# Patient Record
Sex: FEMALE | Race: WHITE | NOT HISPANIC OR LATINO | Employment: UNEMPLOYED | URBAN - METROPOLITAN AREA
[De-identification: names, ages, dates, MRNs, and addresses within clinical notes are randomized per-mention and may not be internally consistent; named-entity substitution may affect disease eponyms.]

---

## 2023-10-04 NOTE — PROGRESS NOTES
[facility]   Pelvic Health Physical Therapy Initial Evaluation     Date: 10/6/2023   Name: Enrique Kilgore  Clinic Number: [id number]    Therapy Diagnosis:   Encounter Diagnoses   Name Primary?    Pelvic pain     Pelvic floor dysfunction      Physician: Sahra Bourgeois MD    Physician Orders: PT Eval and Treat   Medical Diagnosis from Referral: Pelvic pain [R10.2]  Evaluation Date: 10/6/2023  Authorization Period Expiration: 10/02/2024  Plan of Care Expiration: 12/15/2023  Progress Note Due: 2023  Visit # / Visits authorized:    FOTO: Issued Visit #: 1 /3    Precautions: Standard    Time In: 9:15  Time Out: 10:20  Total Appointment Time (timed & untimed codes): 65 minutes    SUBJECTIVE     Date of onset: 10 years ago    History of current condition - Enrique reports: stents placed in fallopian tubes in  to create scar tissue and decrease risk of pregnancy. Over the past 10 years, she has noticed an increase in fatigue, back pain, pelvic pain, and hair loss. She reports pain at times with intercourse and occasional bleeding afterwards. Hormone levels were checked and cleared (Prolactin and FSH). Patient is scheduled for an ultrasound today to view stents.  Patient reporting vaginal delivery in 2013 with 1-2 stitches from tear.     ASIS to PSIS and lower abdominal suprapubic area- hurts everyday. 3/10 . Reports pain with deep pressure. When standing from sitting, she gets a pulling sensation on the front left side of her lower extremity.    [place] pain- she reports that it feels like hitting a wall that that is about to burst. Astroglide brnad lubrication used. 4/10.     OB/GYN History: , vaginal delivery, pushing phase under 30 minutes, painful vaginal penetration, and pelvic pain, 3 UTIs this year  Sexually active? Yes  Pain with vaginal exams, intercourse or tampon use? Yes    Bladder/Bowel History:   Frequency of urination:   Daytime: 5           Nighttime:  0-1  Difficulty initiating urine stream: No  Urine stream: strong  Complete emptying: Yes  Bladder leakage: No  Frequency of incidents: none  Amount leaked (urine):  none  Urinary Urgency: Yes, Able to delay the urge for at least 30 minute(s).  Frequency of bowel movements: once a day  Difficulty initiating BM: No  Quality/Shape of BM: [place] Stool Chart Type 3-4  Does Patient Feel Empty after BM? Yes  Fiber Supplements or Laxative Use? No  Colon leakage: No  Frequency of incidents: none   Amount leaked (bowels):  none  Form of protection: none  Number of pads required in 24 hours: 0      Imaging: ultrasound today     Prior Therapy: no   Social History:  lives with their family  Current exercise: not as much  Occupation:   Prior Level of Function: WFL  Current Level of Function: WFL    Types of fluid intake: water and coffee  Diet: regular    Abuse/Neglect: No     Patients goals: decrease pelvic pain     Medical History: Enrique  has a past medical history of Mitral valve disorder (5/23/2012 10:42:22 AM) and Myalgia and myositis (5/23/2012 10:43:07 AM).     Surgical History: Enrique Kilgore  has a past surgical history that includes Endometrial ablation.    Medications: Enrique has a current medication list which includes the following prescription(s): buspirone, dextroamphetamine-amphetamine, escitalopram oxalate, and trazodone.    Allergies:   Review of patient's allergies indicates:  No Known Allergies     OBJECTIVE     See EMR under MEDIA for written consent provided 10/6/2023    ORTHO SCREEN  Posture in sitting: WNL  Posture in standing: WNL  Pelvic alignment: no sign of deviations noted in supine   SI Joint Palpation: Denies tenderness to SI joint palpation bilaterally. Pain above reported with deep pressure.     AROM:    Thoracolumbar  (single inclinometer at L4/5) AROM  (degrees) Pain/Dysfunction with Movement   Flexion WNL    Extension WNL    Right side bending WNL    Left side bending WNL     Right rotation WNL    Left rotation WNL        Strength:     L/E MMT Right Left Pain/Dysfunction with Movement   Hip Flexion 4/5 4-/5    Hip Extension 4/5 4-/5    Hip Abduction 4+/5 4+/5    Knee Flexion 4+/5 4/5    Knee Extension 4+/5 4/5      Mario test: negative R, positive L     ABDOMINAL WALL ASSESSMENT  Palpation:  WNL  Abdominal strength: Transverse abdominus: good   Scarring: lower abdominal scar from tummy tuck surgery, 3 gallbladder scars upper and right abdominal area  Pelvic Floor Muscle and Transverse Abdominus Synergy: present  Diastasis: absent      BREATHING MECHANICS ASSESSMENT   Thorax Assessment During Quiet Respiration: Decreased excursion of abdominal wall  and Decreased excursion bilaterally of lateral ribs   Thorax Assessment During Deep Respiration: Decreased excursion of abdominal wall     VAGINAL PELVIC FLOOR EXAM    EXTERNAL ASSESSMENT  Introitus: gaping  Skin condition: WNL  Scarring: none   Sensation: WNL   Pain: none  Voluntary contraction: visible lift  Voluntary relaxation: visible drop  Involuntary contraction: reflex tightening  Bearing down: bulge      INTERNAL ASSESSMENT  Pain: tender areas noted as follows: left ischiocavernosus and superficial transverse perineal   Sensation: able to localized pressure appropriately   Vaginal vault: WNL   Muscle Bulk: WFL; increased bulk on the left   Muscle Power: 3-/5  Muscle Endurance: 4 sec   Prolapse check: anterior vaginal wall weakness    Limitation/Restriction for FOTO Pelvic Floor Survey    Therapist reviewed FOTO scores for Enrique Kilgore on 10/6/2023.   FOTO documents entered into [employer] - see Media section.    Limitation Score:   PFDI Pain: 4       TREATMENT     Total Treatment time (time-based codes) separate from Evaluation: 28 minutes     Neuromuscular Re-education to improve Coordination, Control, Down training, Proprioception, and Sense for 20 minutes including:     Diaphragmatic breathing  TA contraction  Happy baby stretch  with deep breathing - reports feeling good to back   Cat cow with deep breathing  Posterior pelvic tilts  Hip flexor stretch     Self-Care/Home Management to improve behavioral/activity modifications related to ADLs, compensatory training, safety procedures, and adaptive equipment for 8 minutes including:    Education on relationship between transverse abdominus and pelvic floor muscles  Education on different types of lubrication  Education on pelvic floor muscle down training     PATIENT EDUCATION AND HOME EXERCISES     Education provided:   general anatomy/physiology of urinary/ bowel  system, benefits of treatment, risks of treatment, and alternative methods of treatment were discussed with the patient. Additionally, anatomy/physiology of pelvic floor, posture/body mechanices, intercourse positions, diaphragmatic breathing, isometric abdominal exercises, kegels, and fluid intake/dietary modifications were reviewed.     Written Home Exercises provided: yes.  Exercises were reviewed and Enrique was able to demonstrate them prior to the end of the session. Enrique demonstrated good  understanding of the education provided. See EMR under Patient Instructions for exercises provided during therapy sessions.    ASSESSMENT     Enrique is a 38 y.o. female referred to outpatient Physical Therapy with a medical diagnosis of pelvic pain. Pt presents with poor trunk stability, pelvic floor tenderness, decreased pelvic muscle strength, decreased endurance of the pelvic muscles, decreased phasic ability of the pelvic muscles, increased tension of the pelvic muscles, poor quality of pelvic muscle contraction, increased nocturia, and poor fluid intake.      Patient prognosis is Fair.   Patient will benefit from skilled outpatient Physical Therapy to address the deficits stated above and in the chart below, provide patient/family education, and to maximize patient's level of independence.     Plan of care discussed with patient:  Yes  Patient's spiritual, cultural and educational needs considered and patient is agreeable to the plan of care and goals as stated below:     Anticipated Barriers for therapy: none    Medical Necessity is demonstrated by the following:    History  Co-morbidities and personal factors that may impact the plan of care [x] LOW: no personal factors / co-morbidities  [] MODERATE: 1-2 personal factors / co-morbidities  [] HIGH: 3+ personal factors / co-morbidities    Personal Factors:   no deficits     Examination  Body Structures and Functions, activity limitations and participation restrictions that may impact the plan of care [x] LOW: addressing 1-2 elements  [] MODERATE: 3+ elements  [] HIGH: 4+ elements (please support below)   Clinical Presentation [x] LOW: stable  [] MODERATE: Evolving  [] HIGH: Unstable     Decision Making/ Complexity Score: low        Goals:  Short Term Goals: 5 weeks   - Pt will demonstrate excellent knowledge and adherence to HEP to facilitate optimal recovery.  - Pt will demonstrate proper PFM contraction, relaxation, and lengthening coordinated with TA and breath for improved muscle coordination needed for functional activity.    Long Term Goals: 10 weeks   - Pt will demonstrate excellent knowledge and adherence to HEP for continued self-maintenance of symptoms.  - Pt will report FOTO score of 0 indicating clinically relevant increase in function.  - Pt will report voiding interval of 2-3 hours for improved ADL tolerance.  - Pt will demonstrate PFM strength of at least 3/5 MMT for improved strength needed to maintain continence.   - Pt will report bearing down appropriately 100% of the time for improved bowel function and decreased stress on adjacent pelvic structures.   - Pt will be able to successfully complete sexual intercourse without pain for improved ADL tolerance.   - Pt will report ability to tolerate speculum exam without pain for improved access to healthcare.  - Pt to increase  strength of B LE by 1 grade to improve stability with functional mobility and improve gait quality for decreased risk for falls with ADLs.  - Patient will report 1/10 pain at worst with sexual intercourse to show improvement in pelvic floor muscle tension and overall mobility.     PLAN     Plan of care Certification: 10/6/2023 to 12/15/2023.    Outpatient Physical Therapy 1 time(s) every 1-2 week(s) for 10 weeks to include the following interventions: Cervical/Lumbar Traction, Electrical Stimulation TENS/IFC, Manual Therapy, Moist Heat/ Ice, Neuromuscular Re-ed, Patient Education, Self Care, Therapeutic Activities, and Therapeutic Exercise.     Nabil Perrin, PT      I CERTIFY THE NEED FOR THESE SERVICES FURNISHED UNDER THIS PLAN OF TREATMENT AND WHILE UNDER MY CARE   Physician's comments:     Physician's Signature: ___________________________________________________

## 2023-10-06 ENCOUNTER — CLINICAL SUPPORT (OUTPATIENT)
Dept: REHABILITATION | Facility: HOSPITAL | Age: 38
End: 2023-10-06
Attending: OBSTETRICS & GYNECOLOGY

## 2023-10-06 DIAGNOSIS — R10.2 PELVIC PAIN: ICD-10-CM

## 2023-10-06 DIAGNOSIS — M62.89 PELVIC FLOOR DYSFUNCTION: ICD-10-CM

## 2023-10-06 PROCEDURE — 97535 SELF CARE MNGMENT TRAINING: CPT | Mod: PN,97

## 2023-10-06 PROCEDURE — 97161 PT EVAL LOW COMPLEX 20 MIN: CPT | Mod: PN,97

## 2023-10-06 PROCEDURE — 97112 NEUROMUSCULAR REEDUCATION: CPT | Mod: PN,97

## 2023-10-06 NOTE — PLAN OF CARE
[facility]   Pelvic Health Physical Therapy Initial Evaluation     Date: 10/6/2023   Name: Enrique Kilgore  Clinic Number: [id number]    Therapy Diagnosis:   Encounter Diagnoses   Name Primary?    Pelvic pain     Pelvic floor dysfunction      Physician: Sahra Bourgeois MD    Physician Orders: PT Eval and Treat   Medical Diagnosis from Referral: Pelvic pain [R10.2]  Evaluation Date: 10/6/2023  Authorization Period Expiration: 10/02/2024  Plan of Care Expiration: 12/15/2023  Progress Note Due: 2023  Visit # / Visits authorized:    FOTO: Issued Visit #: 1 /3    Precautions: Standard    Time In: 9:15  Time Out: 10:20  Total Appointment Time (timed & untimed codes): 65 minutes    SUBJECTIVE     Date of onset: 10 years ago    History of current condition - Enrique reports: stents placed in fallopian tubes in  to create scar tissue and decrease risk of pregnancy. Over the past 10 years, she has noticed an increase in fatigue, back pain, pelvic pain, and hair loss. She reports pain at times with intercourse and occasional bleeding afterwards. Hormone levels were checked and cleared (Prolactin and FSH). Patient is scheduled for an ultrasound today to view stents.  Patient reporting vaginal delivery in 2013 with 1-2 stitches from tear.     ASIS to PSIS and lower abdominal suprapubic area- hurts everyday. 3/10 . Reports pain with deep pressure. When standing from sitting, she gets a pulling sensation on the front left side of her lower extremity.    [place] pain- she reports that it feels like hitting a wall that that is about to burst. Astroglide brnad lubrication used. 4/10.     OB/GYN History: , vaginal delivery, pushing phase under 30 minutes, painful vaginal penetration, and pelvic pain, 3 UTIs this year  Sexually active? Yes  Pain with vaginal exams, intercourse or tampon use? Yes    Bladder/Bowel History:   Frequency of urination:   Daytime: 5           Nighttime:  0-1  Difficulty initiating urine stream: No  Urine stream: strong  Complete emptying: Yes  Bladder leakage: No  Frequency of incidents: none  Amount leaked (urine): none  Urinary Urgency: Yes, Able to delay the urge for at least 30 minute(s).  Frequency of bowel movements: once a day  Difficulty initiating BM: No  Quality/Shape of BM: [place] Stool Chart Type 3-4  Does Patient Feel Empty after BM? Yes  Fiber Supplements or Laxative Use? No  Colon leakage: No  Frequency of incidents: none   Amount leaked (bowels): none  Form of protection: none  Number of pads required in 24 hours: 0      Imaging: ultrasound today     Prior Therapy: no   Social History:  lives with their family  Current exercise: not as much  Occupation:   Prior Level of Function: WFL  Current Level of Function: WFL    Types of fluid intake: water and coffee  Diet: regular    Abuse/Neglect: No     Patients goals: decrease pelvic pain     Medical History: Enrique  has a past medical history of Mitral valve disorder (5/23/2012 10:42:22 AM) and Myalgia and myositis (5/23/2012 10:43:07 AM).     Surgical History: Enrique Kilgore  has a past surgical history that includes Endometrial ablation.    Medications: Enrique has a current medication list which includes the following prescription(s): buspirone, dextroamphetamine-amphetamine, escitalopram oxalate, and trazodone.    Allergies:   Review of patient's allergies indicates:  No Known Allergies     OBJECTIVE     See EMR under MEDIA for written consent provided 10/6/2023    ORTHO SCREEN  Posture in sitting: WNL  Posture in standing: WNL  Pelvic alignment: no sign of deviations noted in supine   SI Joint Palpation: Denies tenderness to SI joint palpation bilaterally. Pain above reported with deep pressure.     AROM:    Thoracolumbar  (single inclinometer at L4/5) AROM  (degrees) Pain/Dysfunction with Movement   Flexion WNL    Extension WNL    Right side bending WNL    Left side bending WNL     Right rotation WNL    Left rotation WNL        Strength:     L/E MMT Right Left Pain/Dysfunction with Movement   Hip Flexion 4/5 4-/5    Hip Extension 4/5 4-/5    Hip Abduction 4+/5 4+/5    Knee Flexion 4+/5 4/5    Knee Extension 4+/5 4/5      Mario test: negative R, positive L     ABDOMINAL WALL ASSESSMENT  Palpation: WNL  Abdominal strength: Transverse abdominus: good   Scarring: lower abdominal scar from tummy tuck surgery, 3 gallbladder scars upper and right abdominal area  Pelvic Floor Muscle and Transverse Abdominus Synergy: present  Diastasis: absent      BREATHING MECHANICS ASSESSMENT   Thorax Assessment During Quiet Respiration: Decreased excursion of abdominal wall  and Decreased excursion bilaterally of lateral ribs   Thorax Assessment During Deep Respiration: Decreased excursion of abdominal wall     VAGINAL PELVIC FLOOR EXAM    EXTERNAL ASSESSMENT  Introitus: gaping  Skin condition: WNL  Scarring: none   Sensation: WNL   Pain: none  Voluntary contraction: visible lift  Voluntary relaxation: visible drop  Involuntary contraction: reflex tightening  Bearing down: bulge      INTERNAL ASSESSMENT  Pain: tender areas noted as follows: left ischiocavernosus and superficial transverse perineal   Sensation: able to localized pressure appropriately   Vaginal vault: WNL   Muscle Bulk: WFL; increased bulk on the left   Muscle Power: 3-/5  Muscle Endurance: 4 sec   Prolapse check: anterior vaginal wall weakness    Limitation/Restriction for FOTO Pelvic Floor Survey    Therapist reviewed FOTO scores for Enrique Kilgore on 10/6/2023.   FOTO documents entered into [employer] - see Media section.    Limitation Score:   PFDI Pain: 4       TREATMENT     Total Treatment time (time-based codes) separate from Evaluation: 28 minutes     Neuromuscular Re-education to improve Coordination, Control, Down training, Proprioception, and Sense for 20 minutes including:     Diaphragmatic breathing  TA contraction  Happy baby stretch with  deep breathing - reports feeling good to back   Cat cow with deep breathing  Posterior pelvic tilts  Hip flexor stretch     Self-Care/Home Management to improve behavioral/activity modifications related to ADLs, compensatory training, safety procedures, and adaptive equipment for 8 minutes including:    Education on relationship between transverse abdominus and pelvic floor muscles  Education on different types of lubrication  Education on pelvic floor muscle down training     PATIENT EDUCATION AND HOME EXERCISES     Education provided:   general anatomy/physiology of urinary/ bowel  system, benefits of treatment, risks of treatment, and alternative methods of treatment were discussed with the patient. Additionally, anatomy/physiology of pelvic floor, posture/body mechanices, intercourse positions, diaphragmatic breathing, isometric abdominal exercises, kegels, and fluid intake/dietary modifications were reviewed.     Written Home Exercises provided: yes.  Exercises were reviewed and Enrique was able to demonstrate them prior to the end of the session. Enrique demonstrated good  understanding of the education provided. See EMR under Patient Instructions for exercises provided during therapy sessions.    ASSESSMENT     Enrique is a 38 y.o. female referred to outpatient Physical Therapy with a medical diagnosis of pelvic pain. Pt presents with poor trunk stability, pelvic floor tenderness, decreased pelvic muscle strength, decreased endurance of the pelvic muscles, decreased phasic ability of the pelvic muscles, increased tension of the pelvic muscles, poor quality of pelvic muscle contraction, increased nocturia, and poor fluid intake.      Patient prognosis is Fair.   Patient will benefit from skilled outpatient Physical Therapy to address the deficits stated above and in the chart below, provide patient/family education, and to maximize patient's level of independence.     Plan of care discussed with patient:  Yes  Patient's spiritual, cultural and educational needs considered and patient is agreeable to the plan of care and goals as stated below:     Anticipated Barriers for therapy: none    Medical Necessity is demonstrated by the following:    History  Co-morbidities and personal factors that may impact the plan of care [x] LOW: no personal factors / co-morbidities  [] MODERATE: 1-2 personal factors / co-morbidities  [] HIGH: 3+ personal factors / co-morbidities    Personal Factors:   no deficits     Examination  Body Structures and Functions, activity limitations and participation restrictions that may impact the plan of care [x] LOW: addressing 1-2 elements  [] MODERATE: 3+ elements  [] HIGH: 4+ elements (please support below)   Clinical Presentation [x] LOW: stable  [] MODERATE: Evolving  [] HIGH: Unstable     Decision Making/ Complexity Score: low        Goals:  Short Term Goals: 5 weeks   - Pt will demonstrate excellent knowledge and adherence to HEP to facilitate optimal recovery.  - Pt will demonstrate proper PFM contraction, relaxation, and lengthening coordinated with TA and breath for improved muscle coordination needed for functional activity.    Long Term Goals: 10 weeks   - Pt will demonstrate excellent knowledge and adherence to HEP for continued self-maintenance of symptoms.  - Pt will report FOTO score of 0 indicating clinically relevant increase in function.  - Pt will report voiding interval of 2-3 hours for improved ADL tolerance.  - Pt will demonstrate PFM strength of at least 3/5 MMT for improved strength needed to maintain continence.   - Pt will report bearing down appropriately 100% of the time for improved bowel function and decreased stress on adjacent pelvic structures.   - Pt will be able to successfully complete sexual intercourse without pain for improved ADL tolerance.   - Pt will report ability to tolerate speculum exam without pain for improved access to healthcare.  - Pt to increase  strength of B LE by 1 grade to improve stability with functional mobility and improve gait quality for decreased risk for falls with ADLs.  - Patient will report 1/10 pain at worst with sexual intercourse to show improvement in pelvic floor muscle tension and overall mobility.     PLAN     Plan of care Certification: 10/6/2023 to 12/15/2023.    Outpatient Physical Therapy 1 time(s) every 1-2 week(s) for 10 weeks to include the following interventions: Cervical/Lumbar Traction, Electrical Stimulation TENS/IFC, Manual Therapy, Moist Heat/ Ice, Neuromuscular Re-ed, Patient Education, Self Care, Therapeutic Activities, and Therapeutic Exercise.     Nabil Perrin, PT      I CERTIFY THE NEED FOR THESE SERVICES FURNISHED UNDER THIS PLAN OF TREATMENT AND WHILE UNDER MY CARE   Physician's comments:     Physician's Signature: ___________________________________________________

## 2025-01-29 ENCOUNTER — TELEPHONE (OUTPATIENT)
Dept: INTERNAL MEDICINE | Facility: CLINIC | Age: 40
End: 2025-01-29
Payer: COMMERCIAL

## 2025-02-17 ENCOUNTER — OFFICE VISIT (OUTPATIENT)
Dept: FAMILY MEDICINE | Facility: CLINIC | Age: 40
End: 2025-02-17
Payer: COMMERCIAL

## 2025-02-17 VITALS
BODY MASS INDEX: 29.9 KG/M2 | OXYGEN SATURATION: 99 % | DIASTOLIC BLOOD PRESSURE: 60 MMHG | HEART RATE: 86 BPM | WEIGHT: 175.13 LBS | SYSTOLIC BLOOD PRESSURE: 110 MMHG | HEIGHT: 64 IN | TEMPERATURE: 99 F

## 2025-02-17 DIAGNOSIS — F32.1 CURRENT MODERATE EPISODE OF MAJOR DEPRESSIVE DISORDER, UNSPECIFIED WHETHER RECURRENT: ICD-10-CM

## 2025-02-17 DIAGNOSIS — F90.8 OTHER SPECIFIED ATTENTION DEFICIT HYPERACTIVITY DISORDER (ADHD): ICD-10-CM

## 2025-02-17 DIAGNOSIS — K21.9 GASTROESOPHAGEAL REFLUX DISEASE, UNSPECIFIED WHETHER ESOPHAGITIS PRESENT: ICD-10-CM

## 2025-02-17 DIAGNOSIS — F41.1 GAD (GENERALIZED ANXIETY DISORDER): Primary | ICD-10-CM

## 2025-02-17 RX ORDER — TRAZODONE HYDROCHLORIDE 50 MG/1
25 TABLET ORAL NIGHTLY PRN
Qty: 90 TABLET | Refills: 1 | Status: SHIPPED | OUTPATIENT
Start: 2025-02-17

## 2025-02-17 RX ORDER — PANTOPRAZOLE SODIUM 40 MG/1
40 TABLET, DELAYED RELEASE ORAL DAILY
COMMUNITY
Start: 2024-11-29 | End: 2025-02-17 | Stop reason: SDUPTHER

## 2025-02-17 RX ORDER — BUSPIRONE HYDROCHLORIDE 7.5 MG/1
7.5 TABLET ORAL 3 TIMES DAILY
Qty: 270 TABLET | Refills: 3 | Status: SHIPPED | OUTPATIENT
Start: 2025-02-17

## 2025-02-17 RX ORDER — DEXTROAMPHETAMINE SACCHARATE, AMPHETAMINE ASPARTATE, DEXTROAMPHETAMINE SULFATE AND AMPHETAMINE SULFATE 3.75; 3.75; 3.75; 3.75 MG/1; MG/1; MG/1; MG/1
15 TABLET ORAL 2 TIMES DAILY
Qty: 60 TABLET | Refills: 0 | Status: SHIPPED | OUTPATIENT
Start: 2025-02-17

## 2025-02-17 RX ORDER — TRAZODONE HYDROCHLORIDE 50 MG/1
25 TABLET ORAL NIGHTLY
COMMUNITY
Start: 2024-11-29 | End: 2025-02-17 | Stop reason: SDUPTHER

## 2025-02-17 RX ORDER — PANTOPRAZOLE SODIUM 40 MG/1
40 TABLET, DELAYED RELEASE ORAL DAILY
Qty: 90 TABLET | Refills: 3 | Status: SHIPPED | OUTPATIENT
Start: 2025-02-17

## 2025-02-17 RX ORDER — HYDROXYZINE HYDROCHLORIDE 10 MG/1
10 TABLET, FILM COATED ORAL 4 TIMES DAILY PRN
COMMUNITY
Start: 2024-11-29

## 2025-02-17 RX ORDER — ESCITALOPRAM OXALATE 10 MG/1
10 TABLET ORAL DAILY
COMMUNITY
Start: 2024-06-18 | End: 2025-02-17

## 2025-02-17 RX ORDER — BUSPIRONE HYDROCHLORIDE 5 MG/1
7.5 TABLET ORAL 2 TIMES DAILY
COMMUNITY
Start: 2024-11-29 | End: 2025-02-17 | Stop reason: SDUPTHER

## 2025-02-17 NOTE — PROGRESS NOTES
Subjective:       Patient ID: Enrique Kilgore is a 39 y.o. female.    Chief Complaint: No chief complaint on file.    History of Present Illness    Patient presents today for follow up She has a one-year history of anxiety and depression managed by her PCP. She was diagnosed with ADHD in her early 20s. She recently discontinued Lexapro within the last few months. She takes BuSpar 7.5 mg 3 times daily, Adderall, Hydroxyzine as needed, and Protonix daily. She reports recent weight gain and suspects hormonal issues. Hormone evaluation with comprehensive labs was performed last week. She has a history of cholecystectomy. Both parents have hypertension. Father has additional history of diabetes and stroke. She exercises 2-3 times per week for 20-30 minutes per session. She denies smoking and reports rare alcohol use, consuming alcohol only a few times per year.      ROS:  General: -fever, -chills, -fatigue, +weight gain, -weight loss  Eyes: -vision changes, -redness, -discharge  ENT: -ear pain, -nasal congestion, -sore throat  Cardiovascular: -chest pain, -palpitations, -lower extremity edema  Respiratory: -cough, -shortness of breath  Gastrointestinal: -abdominal pain, -nausea, -vomiting, -diarrhea, -constipation, -blood in stool  Genitourinary: -dysuria, -hematuria, -frequency  Musculoskeletal: -joint pain, -muscle pain  Skin: -rash, -lesion  Neurological: -headache, -dizziness, -numbness, -tingling  Psychiatric: +anxiety, +depression, -sleep difficulty        Past Medical History:   Diagnosis Date    Mitral valve disorder 5/23/2012 10:42:22 AM    [facility] Historical - Cardiovascular: Mitral Valve Prolapse-No Additional Notes    Myalgia and myositis 5/23/2012 10:43:07 AM    [facility] Historical - Musculoskeletal: Fibromyalgia-No Additional Notes      Medications Ordered Prior to Encounter[1]       Objective:      Physical Exam    General: In no acute distress.  Head: Normocephalic. Non traumatic.  Eyes: PERRLA.  EOMs full. Conjunctivae clear. Fundi grossly normal.  Ears: EACs clear. TMs normal.  Nose: Mucosa pink. Mucosa moist. No obstruction.  Throat: Clear. No exudates. No lesions.  Neck: Supple. No masses. No thyromegaly. No bruits.  Chest: Lungs clear. No rales. No rhonchi. No wheezes.  Heart: RRR. No murmurs. No rubs. No gallops.  Abdomen: Soft. No tenderness. No masses. BS normal.  : Normal external genitalia. No lesions. No discharge. No hernias  noted.  Back: Normal curvature. No scoliosis. No tenderness.  Extremities: Warm. Well perfused. No upper extremity edema. No lower extremity edema. FROM. No deformities. No joint erythema.  Neuro: No focal deficits appreciated. Good muscle tone. Normal response to visual stimuli. Normal response to auditory stimuli.  Skin: Normal. No rashes. No lesions noted.          Assessment/Plan:     1. NILDA (generalized anxiety disorder)    2. Current moderate episode of major depressive disorder, unspecified whether recurrent    3. Other specified attention deficit hyperactivity disorder (ADHD)    4. Gastroesophageal reflux disease, unspecified whether esophagitis present       Assessment & Plan    ANXIETY:  - Assessed current medication regimen for anxiety management.  - Continued BuSpar 7.5 mg 3 times daily.  - Continued Vistaril as needed.  - Noted that anxiety has been managed by primary care.    DEPRESSION:  - Reviewed the patient's medical history, noting current depression.  - Noted that depression has been managed by primary care.  - Documented recent discontinuation of Lexapro.  - Noted that the patient is taking trazodone for sleep.    ADHD:  - Evaluated ADHD diagnosis and treatment.  - Assessed current medication regimen, including Adderall.  - Continued Adderall prescription.  - Requested proof of cognitive testing or previous provider records for ADHD diagnosis to continue Adderall prescription.  - Instructed the patient to contact the office if unable to provide  previous documentation for ADHD diagnosis, as formal cognitive testing may be required.  - Noted that the patient has been taking Adderall since early 20s for ADHD.  - Documented that the patient is unsure if formal cognitive testing was done.    GASTROESOPHAGEAL REFLUX DISEASE (GERD):  - Assessed current medication regimen, including Protonix.  - Explained that long-term use of Protonix is not recommended.  - Continued Protonix daily.      EXERCISE RECOMMENDATIONS:  - Discussed importance of increasing exercise duration for overall health.  - Patient to increase exercise duration to achieve 300 minutes per week.    PATIENT REFUSAL OF VACCINATION:  - Noted patient's decision to decline flu vaccine this year.    FOLLOW UP:  - Additional notes: - Considered recent hormone doctor visit and pending lab results for potential hormonal issues.  - Follow up when lab results from recent hormone doctor visit are available through [facility] portal.    OTHER INSTRUCTIONS:  - Reviewed the patient's medical history, noting gallbladder removal.               No follow-ups on file.    This note was generated with the assistance of ambient listening technology. Verbal consent was obtained by the patient and accompanying visitor(s) for the recording of patient appointment to facilitate this note. I attest to having reviewed and edited the generated note for accuracy, though some syntax or spelling errors may persist. Please contact the author of this note for any clarification.            [1]   Current Outpatient Medications on File Prior to Visit   Medication Sig Dispense Refill    hydrOXYzine HCL (ATARAX) 10 MG Tab Take 10 mg by mouth 4 (four) times daily as needed.      multivitamin with minerals tablet Take 1 tablet by mouth once daily.      [DISCONTINUED] busPIRone (BUSPAR) 5 MG Tab TAKE 1 & 1/2 TABLETS (7.5 MG TOTAL) BY MOUTH 3 (THREE) TIMES DAILY      [DISCONTINUED] busPIRone (BUSPAR) 5 MG Tab Take 7.5 mg by mouth 2 (two) times  daily.      [DISCONTINUED] dextroamphetamine-amphetamine (ADDERALL) 15 mg tablet Take 15 mg by mouth 2 (two) times daily.      [DISCONTINUED] EScitalopram oxalate (LEXAPRO) 10 MG tablet TAKE 1 TABLET (10 MG TOTAL) BY MOUTH DAILY      [DISCONTINUED] pantoprazole (PROTONIX) 40 MG tablet Take 40 mg by mouth once daily.      [DISCONTINUED] traZODone (DESYREL) 50 MG tablet TAKE 1/2 TABLET (25 MG TOTAL) BY MOUTH NIGHTLY      [DISCONTINUED] EScitalopram oxalate (LEXAPRO) 10 MG tablet Take 10 mg by mouth once daily. (Patient not taking: Reported on 2/17/2025)      [DISCONTINUED] traZODone (DESYREL) 50 MG tablet Take 25 mg by mouth every evening. (Patient not taking: Reported on 2/17/2025)       No current facility-administered medications on file prior to visit.

## 2025-05-20 ENCOUNTER — ON-DEMAND VIRTUAL (OUTPATIENT)
Dept: URGENT CARE | Facility: CLINIC | Age: 40
End: 2025-05-20
Payer: COMMERCIAL

## 2025-05-20 DIAGNOSIS — N30.00 ACUTE CYSTITIS WITHOUT HEMATURIA: Primary | ICD-10-CM

## 2025-05-20 RX ORDER — NITROFURANTOIN 25; 75 MG/1; MG/1
100 CAPSULE ORAL 2 TIMES DAILY
Qty: 14 CAPSULE | Refills: 0 | Status: SHIPPED | OUTPATIENT
Start: 2025-05-20 | End: 2025-05-27

## 2025-05-20 NOTE — PROGRESS NOTES
Subjective:      Patient ID: Enrique Kilgore is a 39 y.o. female.    Vitals:  vitals were not taken for this visit.     Chief Complaint: Urinary Tract Infection      Visit Type: TELE AUDIOVISUAL    Patient Location: [facility]     Present with the patient at the time of consultation: TELEMED PRESENT WITH PATIENT: None    Past Medical History:   Diagnosis Date    Mitral valve disorder 5/23/2012 10:42:22 AM    [facility] Historical - Cardiovascular: Mitral Valve Prolapse-No Additional Notes    Myalgia and myositis 5/23/2012 10:43:07 AM    [facility] Historical - Musculoskeletal: Fibromyalgia-No Additional Notes     Past Surgical History:   Procedure Laterality Date    ENDOMETRIAL ABLATION       Review of patient's allergies indicates:  No Known Allergies  Medications Ordered Prior to Encounter[1]  No family history on file.    Medications Ordered                [facility] - [place] - [address] [place] [address]   [address] [place] [address], [place] [address]    Telephone: [phone]   Fax: [phone]   Hours: Not open 24 hours                         E-Prescribed (1 of 1)              nitrofurantoin, macrocrystal-monohydrate, (MACROBID) 100 MG capsule    Sig: Take 1 capsule (100 mg total) by mouth 2 (two) times daily. for 7 days       Start: 5/20/25     Quantity: 14 capsule Refills: 0                           [facility] Peq Odvv Intake    5/20/2025 11:13 AM CDT - Filed by Patient   What is your current physical address in the event of a medical emergency? [address] [place] [id number]   Are you able to take your vital signs? Yes   Systolic Blood Pressure: 133   Diastolic Blood Pressure: 88   Weight: 160   Height: 64   Pulse: 75   Temperature: 97.5   Respiration rate: 20   Pulse Oxygen: 100   Please attach any relevant images or files    Is your employer contracted with [facility]? No         Pt presents UTI symptoms x 2 days with dysuria, Started pyridium.  Denies fever, back pain, blood in  urine, ha, or dizziness.   LMP: uterine ablation, no menses       Urinary Tract Infection   This is a new problem. The current episode started in the past 7 days. The problem has been gradually worsening. The quality of the pain is described as burning. There has been no fever. Associated symptoms include frequency and urgency. Pertinent negatives include no discharge, flank pain, hematuria, nausea, possible pregnancy or vomiting.       Constitution: Negative for fever.   Respiratory:  Negative for shortness of breath.    Gastrointestinal:  Positive for abdominal pain. Negative for nausea and vomiting.   Genitourinary:  Positive for dysuria, frequency, urgency and pelvic pain. Negative for flank pain, bladder incontinence and hematuria.   Musculoskeletal:  Negative for back pain.        Objective:   The physical exam was conducted virtually.  Physical Exam   Constitutional: She is oriented to person, place, and time. No distress.   HENT:   Head: Normocephalic.   Ears:   Right Ear: External ear normal.   Left Ear: External ear normal.   Nose: No rhinorrhea or congestion.   Eyes: Conjunctivae are normal.   Neck: Neck supple.   Pulmonary/Chest: Effort normal. No respiratory distress.   Neurological: She is alert and oriented to person, place, and time.   Skin: Skin is no rash.       Assessment:     1. Acute cystitis without hematuria        Plan:   Increase fluids and rest,  Continue medications as prescribed  Take all antibiotics as directed  Wipe from front to back, limit bubble bath, limit use of thong under ware, void after intercourse  F/u with PCP in 2-3 days,   Go to Urgent Care or ER if symptoms worsening or not improved      Acute cystitis without hematuria  -     nitrofurantoin, macrocrystal-monohydrate, (MACROBID) 100 MG capsule; Take 1 capsule (100 mg total) by mouth 2 (two) times daily. for 7 days  Dispense: 14 capsule; Refill: 0    We appreciate you trusting us with your medical care. We hope you feel  better soon. We will be happy to take care of you for all of your future medical needs.     You must understand that you've received Virtual treatment only and that you may be released before all your medical problems are known or treated. You, the patient, will arrange for follow up care as instructed.     Follow up with your PCP or specialty clinic as directed in the next 1-2 weeks if not improved or as needed. You can call [phone] to schedule an appointment with the appropriate provider.     If your condition worsens we recommend that you receive another evaluation in person, with your primary care provider, urgent care or at the emergency room immediately or contact your primary medical clinics after hours call service to discuss your concerns.                     [1]   Current Outpatient Medications on File Prior to Visit   Medication Sig Dispense Refill    busPIRone (BUSPAR) 7.5 MG tablet Take 1 tablet (7.5 mg total) by mouth 3 (three) times daily. 270 tablet 3    dextroamphetamine-amphetamine (ADDERALL) 15 mg tablet Take 1 tablet (15 mg total) by mouth 2 (two) times daily. 60 tablet 0    hydrOXYzine HCL (ATARAX) 10 MG Tab Take 10 mg by mouth 4 (four) times daily as needed.      multivitamin with minerals tablet Take 1 tablet by mouth once daily.      pantoprazole (PROTONIX) 40 MG tablet Take 1 tablet (40 mg total) by mouth once daily. 90 tablet 3    traZODone (DESYREL) 50 MG tablet Take 0.5 tablets (25 mg total) by mouth nightly as needed for Insomnia. 90 tablet 1     No current facility-administered medications on file prior to visit.

## 2025-05-20 NOTE — PATIENT INSTRUCTIONS
